# Patient Record
(demographics unavailable — no encounter records)

---

## 2024-12-16 NOTE — HEALTH RISK ASSESSMENT
[Good] : ~his/her~  mood as  good [Yes] : Yes [Monthly or less (1 pt)] : Monthly or less (1 point) [1 or 2 (0 pts)] : 1 or 2 (0 points) [Never (0 pts)] : Never (0 points) [No] : In the past 12 months have you used drugs other than those required for medical reasons? No [0] : 2) Feeling down, depressed, or hopeless: Not at all (0) [PHQ-2 Negative - No further assessment needed] : PHQ-2 Negative - No further assessment needed [Never] : Never [NO] : No [None] : None [With Family] : lives with family [Employed] : employed [] :  [# Of Children ___] : has [unfilled] children [Audit-CScore] : 1 [[id number]] : 0

## 2024-12-16 NOTE — HISTORY OF PRESENT ILLNESS
[de-identified] : 40 year old M with PMH eosinophilic esophagitis presents for CPE. Complains of skin issues. Pt denies CP/SOB, fever/chills, n/v/d/c.

## 2024-12-16 NOTE — PLAN
[FreeTextEntry1] : Routine blood work drawn in office and urine collected today. Refer to dermatology. Pt advised to sign up for [facility] portal to review labs and communicate any questions or concerns directly. Yearly physical and return as needed for illness, medication refills, and new or existing complaints.

## 2025-07-29 NOTE — HISTORY OF PRESENT ILLNESS
[FreeTextEntry8] : 40 year old M with PMH HLD presents for left groin pain. Pt denies CP/SOB, fever/chills, n/v/d/c.

## 2025-07-29 NOTE — PLAN
[FreeTextEntry1] : Plan as above. RICE advised. US ordered. Refer to PT. Pt advised to sign up for [facility] portal to review labs and communicate any questions or concerns directly. Yearly physical and return as needed for illness, medication refills, and new or existing complaints. If no improvement, will consider MRI or referral to orthopedics.

## 2025-07-29 NOTE — PHYSICAL EXAM
[No Acute Distress] : no acute distress [Well Nourished] : well nourished [Well Developed] : well developed [Well-Appearing] : well-appearing [No Respiratory Distress] : no respiratory distress  [No Accessory Muscle Use] : no accessory muscle use [de-identified] : no masses noted in groin, scrotum, or abdomen [de-identified] : negative pain elicited on internal or external rotation of hip